# Patient Record
Sex: FEMALE | Race: ASIAN | Employment: FULL TIME | URBAN - METROPOLITAN AREA
[De-identification: names, ages, dates, MRNs, and addresses within clinical notes are randomized per-mention and may not be internally consistent; named-entity substitution may affect disease eponyms.]

---

## 2020-01-28 ENCOUNTER — OFFICE VISIT (OUTPATIENT)
Dept: INTERNAL MEDICINE CLINIC | Facility: HOSPITAL | Age: 35
End: 2020-01-28
Attending: EMERGENCY MEDICINE

## 2020-01-28 DIAGNOSIS — Z00.00 WELLNESS EXAMINATION: Primary | ICD-10-CM

## 2020-01-28 LAB
RUBV IGG SER QL: POSITIVE
RUBV IGG SER-ACNC: 12.8 IU/ML (ref 10–?)

## 2020-01-28 PROCEDURE — 86762 RUBELLA ANTIBODY: CPT

## 2020-01-28 PROCEDURE — 86765 RUBEOLA ANTIBODY: CPT

## 2020-01-28 PROCEDURE — 86787 VARICELLA-ZOSTER ANTIBODY: CPT

## 2020-01-28 PROCEDURE — 86735 MUMPS ANTIBODY: CPT

## 2020-01-28 PROCEDURE — 86480 TB TEST CELL IMMUN MEASURE: CPT

## 2020-01-29 LAB
M TB IFN-G CD4+ T-CELLS BLD-ACNC: 0.14 IU/ML
M TB TUBERC IFN-G BLD QL: NEGATIVE
M TB TUBERC IGNF/MITOGEN IGNF CONTROL: >10 IU/ML
MEV IGG SER-ACNC: >300 AU/ML (ref 16.5–?)
MUV IGG SER IA-ACNC: 27 AU/ML (ref 11–?)
QUANTIFERON TB1 MINUS NIL: -0.05 IU/ML
QUANTIFERON TB2 MINUS NIL: -0.07 IU/ML
VZV IGG SER IA-ACNC: 1261 (ref 165–?)

## 2020-05-22 PROBLEM — R79.89 ABNORMAL ALDOSTERONE TO RENIN RATIO: Status: ACTIVE | Noted: 2020-05-22

## 2020-07-30 PROBLEM — I10 ESSENTIAL HYPERTENSION: Status: ACTIVE | Noted: 2020-07-30

## 2021-03-09 ENCOUNTER — IMMUNIZATION (OUTPATIENT)
Dept: LAB | Age: 36
End: 2021-03-09
Attending: HOSPITALIST
Payer: COMMERCIAL

## 2021-03-09 DIAGNOSIS — Z23 NEED FOR VACCINATION: Primary | ICD-10-CM

## 2021-03-09 PROCEDURE — 0001A SARSCOV2 VAC 30MCG/0.3ML IM: CPT

## 2021-03-30 ENCOUNTER — IMMUNIZATION (OUTPATIENT)
Dept: LAB | Age: 36
End: 2021-03-30
Attending: HOSPITALIST
Payer: COMMERCIAL

## 2021-03-30 DIAGNOSIS — Z23 NEED FOR VACCINATION: Primary | ICD-10-CM

## 2021-03-30 PROCEDURE — 0002A SARSCOV2 VAC 30MCG/0.3ML IM: CPT

## 2021-08-27 PROBLEM — M79.89 LEG SWELLING: Status: ACTIVE | Noted: 2021-08-27

## 2021-08-27 PROBLEM — Z00.00 ROUTINE MEDICAL EXAM: Status: ACTIVE | Noted: 2021-08-27

## 2021-08-27 PROBLEM — G25.81 RESTLESS LEGS: Status: ACTIVE | Noted: 2021-08-27

## 2021-08-27 PROBLEM — Z13.1 SCREENING FOR DIABETES MELLITUS (DM): Status: ACTIVE | Noted: 2021-08-27

## 2021-08-27 PROBLEM — L30.1 DYSHIDROTIC ECZEMA: Status: ACTIVE | Noted: 2021-08-27

## 2021-08-27 PROBLEM — N92.4 EXCESSIVE BLEEDING IN PREMENOPAUSAL PERIOD: Status: ACTIVE | Noted: 2021-08-27

## 2021-12-08 PROBLEM — E61.1 IRON DEFICIENCY: Status: ACTIVE | Noted: 2021-12-08

## 2023-03-15 ENCOUNTER — TELEPHONE (OUTPATIENT)
Dept: OTHER | Facility: HOSPITAL | Age: 38
End: 2023-03-15

## 2023-03-15 ENCOUNTER — ORDER TRANSCRIPTION (OUTPATIENT)
Dept: ADMINISTRATIVE | Facility: HOSPITAL | Age: 38
End: 2023-03-15

## 2023-03-15 DIAGNOSIS — Z13.6 SCREENING FOR CARDIOVASCULAR CONDITION: Primary | ICD-10-CM

## 2023-03-15 NOTE — PROGRESS NOTES
Call to pt after pt LVM on RN Heart Line. Pt identifiers completed. Pt requests to have a HS. Discussed guidelines and recommendations regarding HS, specifically age and risk factors. Pt denies family history and personal history of heart disease and/or high cholesterol. Pt shared she recently went to an immediate care for chest pain after her father was hospitalized for bone related ailment. Pt states she was diagnosed with high blood pressure and anxiety and prescribed medication. Pt states she is still experiencing pain in the chest which is relieved \"when I lift up my bra strap and when I take deep breaths\". Pt states has history of reflux as well. Pt denies taking medications as prescribed. Discussed importance of BP meds in addition to taking benzos as prescribed. Pt verbalized understanding. Encouraged pt to take medications as prescribed and to follow up with PCP. All questions and concerns addressed.